# Patient Record
Sex: FEMALE | Race: WHITE | NOT HISPANIC OR LATINO | Employment: OTHER | URBAN - METROPOLITAN AREA
[De-identification: names, ages, dates, MRNs, and addresses within clinical notes are randomized per-mention and may not be internally consistent; named-entity substitution may affect disease eponyms.]

---

## 2023-09-10 ENCOUNTER — HOSPITAL ENCOUNTER (OUTPATIENT)
Facility: CLINIC | Age: 78
Setting detail: OBSERVATION
Discharge: HOME | End: 2023-09-11
Attending: EMERGENCY MEDICINE | Admitting: EMERGENCY MEDICINE
Payer: COMMERCIAL

## 2023-09-10 ENCOUNTER — APPOINTMENT (OUTPATIENT)
Dept: CT IMAGING | Facility: CLINIC | Age: 78
End: 2023-09-10
Attending: EMERGENCY MEDICINE
Payer: COMMERCIAL

## 2023-09-10 DIAGNOSIS — R42 DIZZINESS: ICD-10-CM

## 2023-09-10 DIAGNOSIS — I60.9 SUBARACHNOID HEMORRHAGE (H): ICD-10-CM

## 2023-09-10 DIAGNOSIS — Z23 NEED FOR DIPHTHERIA-TETANUS-PERTUSSIS (TDAP) VACCINE: ICD-10-CM

## 2023-09-10 DIAGNOSIS — I62.00 SUBDURAL HEMORRHAGE (H): ICD-10-CM

## 2023-09-10 DIAGNOSIS — S01.511A LIP LACERATION, INITIAL ENCOUNTER: ICD-10-CM

## 2023-09-10 DIAGNOSIS — S01.511A LIP LACERATION, INITIAL ENCOUNTER: Primary | ICD-10-CM

## 2023-09-10 LAB
ABO/RH(D): NORMAL
ALBUMIN SERPL BCG-MCNC: 4.3 G/DL (ref 3.5–5.2)
ALP SERPL-CCNC: 110 U/L (ref 35–104)
ALT SERPL W P-5'-P-CCNC: 27 U/L (ref 0–50)
ANION GAP SERPL CALCULATED.3IONS-SCNC: 12 MMOL/L (ref 7–15)
ANTIBODY SCREEN: NEGATIVE
AST SERPL W P-5'-P-CCNC: 28 U/L (ref 0–45)
ATRIAL RATE - MUSE: 80 BPM
BASOPHILS # BLD AUTO: 0.1 10E3/UL (ref 0–0.2)
BASOPHILS NFR BLD AUTO: 1 %
BILIRUB SERPL-MCNC: 0.3 MG/DL
BUN SERPL-MCNC: 11.7 MG/DL (ref 8–23)
CALCIUM SERPL-MCNC: 9.5 MG/DL (ref 8.8–10.2)
CHLORIDE SERPL-SCNC: 100 MMOL/L (ref 98–107)
CREAT SERPL-MCNC: 0.96 MG/DL (ref 0.51–0.95)
DEPRECATED HCO3 PLAS-SCNC: 25 MMOL/L (ref 22–29)
DIASTOLIC BLOOD PRESSURE - MUSE: NORMAL MMHG
EGFRCR SERPLBLD CKD-EPI 2021: 60 ML/MIN/1.73M2
EOSINOPHIL # BLD AUTO: 0.1 10E3/UL (ref 0–0.7)
EOSINOPHIL NFR BLD AUTO: 1 %
ERYTHROCYTE [DISTWIDTH] IN BLOOD BY AUTOMATED COUNT: 12.2 % (ref 10–15)
GLUCOSE SERPL-MCNC: 121 MG/DL (ref 70–99)
HCT VFR BLD AUTO: 40.7 % (ref 35–47)
HGB BLD-MCNC: 13.7 G/DL (ref 11.7–15.7)
IMM GRANULOCYTES # BLD: 0 10E3/UL
IMM GRANULOCYTES NFR BLD: 0 %
INR PPP: 0.95 (ref 0.85–1.15)
INTERPRETATION ECG - MUSE: NORMAL
LYMPHOCYTES # BLD AUTO: 1.5 10E3/UL (ref 0.8–5.3)
LYMPHOCYTES NFR BLD AUTO: 15 %
MCH RBC QN AUTO: 31.4 PG (ref 26.5–33)
MCHC RBC AUTO-ENTMCNC: 33.7 G/DL (ref 31.5–36.5)
MCV RBC AUTO: 93 FL (ref 78–100)
MONOCYTES # BLD AUTO: 0.6 10E3/UL (ref 0–1.3)
MONOCYTES NFR BLD AUTO: 6 %
NEUTROPHILS # BLD AUTO: 7.9 10E3/UL (ref 1.6–8.3)
NEUTROPHILS NFR BLD AUTO: 77 %
NRBC # BLD AUTO: 0 10E3/UL
NRBC BLD AUTO-RTO: 0 /100
P AXIS - MUSE: 61 DEGREES
PLATELET # BLD AUTO: 280 10E3/UL (ref 150–450)
POTASSIUM SERPL-SCNC: 4.2 MMOL/L (ref 3.4–5.3)
PR INTERVAL - MUSE: 176 MS
PROT SERPL-MCNC: 6.6 G/DL (ref 6.4–8.3)
QRS DURATION - MUSE: 156 MS
QT - MUSE: 450 MS
QTC - MUSE: 519 MS
R AXIS - MUSE: -64 DEGREES
RADIOLOGIST FLAGS: ABNORMAL
RBC # BLD AUTO: 4.36 10E6/UL (ref 3.8–5.2)
SODIUM SERPL-SCNC: 137 MMOL/L (ref 136–145)
SPECIMEN EXPIRATION DATE: NORMAL
SYSTOLIC BLOOD PRESSURE - MUSE: NORMAL MMHG
T AXIS - MUSE: 87 DEGREES
VENTRICULAR RATE- MUSE: 80 BPM
WBC # BLD AUTO: 10.1 10E3/UL (ref 4–11)

## 2023-09-10 PROCEDURE — 80053 COMPREHEN METABOLIC PANEL: CPT | Performed by: EMERGENCY MEDICINE

## 2023-09-10 PROCEDURE — 90715 TDAP VACCINE 7 YRS/> IM: CPT | Performed by: EMERGENCY MEDICINE

## 2023-09-10 PROCEDURE — 12013 RPR F/E/E/N/L/M 2.6-5.0 CM: CPT | Mod: 59

## 2023-09-10 PROCEDURE — G0378 HOSPITAL OBSERVATION PER HR: HCPCS

## 2023-09-10 PROCEDURE — 250N000013 HC RX MED GY IP 250 OP 250 PS 637: Performed by: EMERGENCY MEDICINE

## 2023-09-10 PROCEDURE — 90471 IMMUNIZATION ADMIN: CPT | Performed by: EMERGENCY MEDICINE

## 2023-09-10 PROCEDURE — 85610 PROTHROMBIN TIME: CPT | Performed by: EMERGENCY MEDICINE

## 2023-09-10 PROCEDURE — 86901 BLOOD TYPING SEROLOGIC RH(D): CPT | Performed by: EMERGENCY MEDICINE

## 2023-09-10 PROCEDURE — 36415 COLL VENOUS BLD VENIPUNCTURE: CPT | Performed by: EMERGENCY MEDICINE

## 2023-09-10 PROCEDURE — 93005 ELECTROCARDIOGRAM TRACING: CPT

## 2023-09-10 PROCEDURE — 85025 COMPLETE CBC W/AUTO DIFF WBC: CPT | Performed by: EMERGENCY MEDICINE

## 2023-09-10 PROCEDURE — 99285 EMERGENCY DEPT VISIT HI MDM: CPT | Mod: 25

## 2023-09-10 PROCEDURE — 250N000011 HC RX IP 250 OP 636: Performed by: EMERGENCY MEDICINE

## 2023-09-10 PROCEDURE — 86850 RBC ANTIBODY SCREEN: CPT | Performed by: EMERGENCY MEDICINE

## 2023-09-10 PROCEDURE — 70450 CT HEAD/BRAIN W/O DYE: CPT

## 2023-09-10 PROCEDURE — 250N000013 HC RX MED GY IP 250 OP 250 PS 637: Performed by: STUDENT IN AN ORGANIZED HEALTH CARE EDUCATION/TRAINING PROGRAM

## 2023-09-10 PROCEDURE — 99204 OFFICE O/P NEW MOD 45 MIN: CPT | Performed by: PHYSICIAN ASSISTANT

## 2023-09-10 PROCEDURE — 99223 1ST HOSP IP/OBS HIGH 75: CPT | Mod: AI | Performed by: STUDENT IN AN ORGANIZED HEALTH CARE EDUCATION/TRAINING PROGRAM

## 2023-09-10 RX ORDER — SIMVASTATIN 40 MG
40 TABLET ORAL AT BEDTIME
COMMUNITY

## 2023-09-10 RX ORDER — LORAZEPAM 0.5 MG/1
0.5 TABLET ORAL EVERY 6 HOURS PRN
COMMUNITY

## 2023-09-10 RX ORDER — CITALOPRAM HYDROBROMIDE 20 MG/1
20 TABLET ORAL DAILY
Status: DISCONTINUED | OUTPATIENT
Start: 2023-09-11 | End: 2023-09-11 | Stop reason: HOSPADM

## 2023-09-10 RX ORDER — ACETAMINOPHEN 650 MG/1
650 SUPPOSITORY RECTAL EVERY 6 HOURS PRN
Status: DISCONTINUED | OUTPATIENT
Start: 2023-09-10 | End: 2023-09-11 | Stop reason: HOSPADM

## 2023-09-10 RX ORDER — ACETAMINOPHEN 325 MG/1
650 TABLET ORAL ONCE
Status: COMPLETED | OUTPATIENT
Start: 2023-09-10 | End: 2023-09-10

## 2023-09-10 RX ORDER — TRAZODONE HYDROCHLORIDE 50 MG/1
50 TABLET, FILM COATED ORAL
Status: DISCONTINUED | OUTPATIENT
Start: 2023-09-10 | End: 2023-09-11 | Stop reason: HOSPADM

## 2023-09-10 RX ORDER — HYDRALAZINE HYDROCHLORIDE 20 MG/ML
5 INJECTION INTRAMUSCULAR; INTRAVENOUS EVERY 4 HOURS PRN
Status: DISCONTINUED | OUTPATIENT
Start: 2023-09-10 | End: 2023-09-11 | Stop reason: HOSPADM

## 2023-09-10 RX ORDER — GABAPENTIN 300 MG/1
300 CAPSULE ORAL EVERY MORNING
Status: DISCONTINUED | OUTPATIENT
Start: 2023-09-11 | End: 2023-09-11 | Stop reason: HOSPADM

## 2023-09-10 RX ORDER — ACETAMINOPHEN 325 MG/1
650 TABLET ORAL EVERY 6 HOURS PRN
Status: DISCONTINUED | OUTPATIENT
Start: 2023-09-10 | End: 2023-09-11 | Stop reason: HOSPADM

## 2023-09-10 RX ORDER — SIMVASTATIN 40 MG
40 TABLET ORAL AT BEDTIME
Status: DISCONTINUED | OUTPATIENT
Start: 2023-09-10 | End: 2023-09-11 | Stop reason: HOSPADM

## 2023-09-10 RX ORDER — CITALOPRAM HYDROBROMIDE 20 MG/1
20 TABLET ORAL DAILY
COMMUNITY

## 2023-09-10 RX ORDER — GABAPENTIN 300 MG/1
300 CAPSULE ORAL EVERY MORNING
COMMUNITY

## 2023-09-10 RX ORDER — LAMOTRIGINE 200 MG/1
200 TABLET ORAL 2 TIMES DAILY
COMMUNITY

## 2023-09-10 RX ORDER — TRAZODONE HYDROCHLORIDE 50 MG/1
50 TABLET, FILM COATED ORAL
COMMUNITY

## 2023-09-10 RX ORDER — GABAPENTIN 300 MG/1
600 CAPSULE ORAL AT BEDTIME
Status: DISCONTINUED | OUTPATIENT
Start: 2023-09-10 | End: 2023-09-11 | Stop reason: HOSPADM

## 2023-09-10 RX ORDER — ONDANSETRON 2 MG/ML
4 INJECTION INTRAMUSCULAR; INTRAVENOUS EVERY 6 HOURS PRN
Status: DISCONTINUED | OUTPATIENT
Start: 2023-09-10 | End: 2023-09-11 | Stop reason: HOSPADM

## 2023-09-10 RX ORDER — ONDANSETRON 4 MG/1
4 TABLET, ORALLY DISINTEGRATING ORAL EVERY 6 HOURS PRN
Status: DISCONTINUED | OUTPATIENT
Start: 2023-09-10 | End: 2023-09-11 | Stop reason: HOSPADM

## 2023-09-10 RX ORDER — LAMOTRIGINE 100 MG/1
200 TABLET ORAL 2 TIMES DAILY
Status: DISCONTINUED | OUTPATIENT
Start: 2023-09-10 | End: 2023-09-11 | Stop reason: HOSPADM

## 2023-09-10 RX ORDER — GABAPENTIN 300 MG/1
600 CAPSULE ORAL AT BEDTIME
COMMUNITY

## 2023-09-10 RX ADMIN — GABAPENTIN 600 MG: 300 CAPSULE ORAL at 21:09

## 2023-09-10 RX ADMIN — SIMVASTATIN 40 MG: 40 TABLET, FILM COATED ORAL at 21:09

## 2023-09-10 RX ADMIN — ACETAMINOPHEN 650 MG: 325 TABLET, FILM COATED ORAL at 16:06

## 2023-09-10 RX ADMIN — ACETAMINOPHEN 650 MG: 325 TABLET, FILM COATED ORAL at 22:20

## 2023-09-10 RX ADMIN — LAMOTRIGINE 200 MG: 100 TABLET ORAL at 21:08

## 2023-09-10 RX ADMIN — CLOSTRIDIUM TETANI TOXOID ANTIGEN (FORMALDEHYDE INACTIVATED), CORYNEBACTERIUM DIPHTHERIAE TOXOID ANTIGEN (FORMALDEHYDE INACTIVATED), BORDETELLA PERTUSSIS TOXOID ANTIGEN (GLUTARALDEHYDE INACTIVATED), BORDETELLA PERTUSSIS FILAMENTOUS HEMAGGLUTININ ANTIGEN (FORMALDEHYDE INACTIVATED), BORDETELLA PERTUSSIS PERTACTIN ANTIGEN, AND BORDETELLA PERTUSSIS FIMBRIAE 2/3 ANTIGEN 0.5 ML: 5; 2; 2.5; 5; 3; 5 INJECTION, SUSPENSION INTRAMUSCULAR at 13:55

## 2023-09-10 ASSESSMENT — ACTIVITIES OF DAILY LIVING (ADL)
ADLS_ACUITY_SCORE: 33
ADLS_ACUITY_SCORE: 33
ADLS_ACUITY_SCORE: 37
ADLS_ACUITY_SCORE: 37
ADLS_ACUITY_SCORE: 33

## 2023-09-10 NOTE — PHARMACY-ADMISSION MEDICATION HISTORY
Pharmacy Intern Admission Medication History    Admission medication history is complete. The information provided in this note is only as accurate as the sources available at the time of the update.    Medication reconciliation/reorder completed by provider prior to medication history? No    Information Source(s): Patient and CareEverywhere/SureScripts via in-person    Pertinent Information: Patient takes trazodone as needed but has not needed much in the last month due to taking gabapentin which helps her sleep.     Changes made to PTA medication list:  Added: citalopram, gabapentin, simvastatin, Lamictal, trazodone, Ativan  Deleted: None  Changed: None    Medication Affordability: Not including over the counter (OTC) medications, was there a time in the past 3 months when you did not take your medications as prescribed because of cost? No     Allergies reviewed with patient and updates made in EHR: yes- no updates made     Medication History Completed By: Jazz Bernstein 9/10/2023 2:24 PM    Prior to Admission medications    Medication Sig Last Dose Taking? Auth Provider Long Term End Date   citalopram (CELEXA) 20 MG tablet Take 20 mg by mouth daily 9/10/2023 at AM Yes Unknown, Entered By History Yes    gabapentin (NEURONTIN) 300 MG capsule Take 300 mg by mouth every morning 9/10/2023 at AM Yes Unknown, Entered By History Yes    gabapentin (NEURONTIN) 300 MG capsule Take 600 mg by mouth At Bedtime 9/9/2023 at HS Yes Unknown, Entered By History Yes    lamoTRIgine (LAMICTAL) 200 MG tablet Take 200 mg by mouth 2 times daily 9/10/2023 at AM Yes Unknown, Entered By History Yes    LORazepam (ATIVAN) 0.5 MG tablet Take 0.5 mg by mouth every 6 hours as needed for anxiety  at PRN Yes Unknown, Entered By History     simvastatin (ZOCOR) 40 MG tablet Take 40 mg by mouth At Bedtime 9/9/2023 at PM Yes Unknown, Entered By History Yes    traZODone (DESYREL) 50 MG tablet Take 50 mg by mouth nightly as needed for sleep Past Month  at PRN Yes Unknown, Entered By History Yes

## 2023-09-10 NOTE — H&P
[facility]    History and Physical - Hospitalist Service       Date of Admission:  9/10/2023    Assessment & Plan      Acacia Pinedo is a 77 year old female who presented to the ED for a fall. She was found to have ICH and will be admitted for further management.    Fall  Subdural hematoma  Subarachnoid hemorrhage  Scalp hematoma  Lip laceration  Patient has history of falls over the last 6 months. She denies dizziness most of the time and feels just unsteady on her feet resulting in falling forward onto her face. Last fall was over a month ago while on a trip, she fell on her face while stepping off the bus. She also reports tripping on her steps often at home. Today while walking up her steps she doesn't know what happened but likely tripped on the top step resulting in her falling forward. She was carrying a hotdish and the dish was found broken. She doesn't completely remember how she fell but did pull herself up and drive [facility] her with several facial injuries and took her to ED. Within the ED, CT imaging of the head revealed multifocal acute ICH. Neurosurgery was contacted and recommended admission for observation. Tele noted appearance of LBBB but regular.  *Although not stated on med list patient does report taking daily 81mg aspirin    - admit to observation with repeat head CT ordered for tomorrow AM. Keep HOB >30 degrees  - start small dose of PRN hydralazine for BP >150  - initiate syncope work up with cardiac echo, tele, orthostatic BP and PT evaluation. Obtain initial EKG as one was not done in the ER.  - lip stiches will need to be removed in 5-7 days  - patient has outpatient MRI planned next week for her frequent falls. She asked whether this could be done here however I am uncertain how her new hemorrhage will affect the results. CT head reassuring without infarcts.    EKG returned with new since LBBB since 2012. Last EKG I  could see was in [facility] system. On admission patient denied chest pain or palpitations prior to her fall. No hypoxia. Recommend to monitor on tele overnight and follow results of echo.        Chronic stable medical problems  Anxiety  Depression  - resume home medications as indicated           Diet:  FULL  DVT Prophylaxis: Pneumatic Compression Devices  Cruz Catheter: Not present  Lines: None     Cardiac Monitoring: None  Code Status:  DNR    Clinically Significant Risk Factors Present on Admission                                  Disposition Plan      Expected Discharge Date: 09/11/2023                  Michaela Dan DO  Hospitalist Service  [facility]  Securely message with [employer] (more info)  Text page via [facility] Paging/Directory     ______________________________________________________________________    Chief Complaint   fall    History is obtained from the patient    History of Present Illness   Patient has history of falls over the last 6 months. She denies dizziness most of the time and feels just unsteady on her feet resulting in falling forward onto her face. Last fall was over a month ago while on a trip, she fell on her face while stepping off the bus. She also reports tripping on her steps often at home. Today while walking up her steps she doesn't know what happened but likely tripped on the top step resulting in her falling forward. She was carrying a hotdish and the dish was found broken. She doesn't completely remember how she fell but did pull herself up and drive to her [facility] where her friends found her with several facial injuries and took her to ED. Patient does follow with the dizzy clinic but doesn't think it has really helped much. She denies palpitation ever and denies dizziness most of the time she falls however a few times she has felt dizzy. She does report taking a while to stand up and sometimes her knees buckling due to weakness. She lives alone.  She did drive to her chruch after her fall. She denies fevers or chills. No dysuria.       Past Medical History    No past medical history on file.    Past Surgical History   No past surgical history on file.    Prior to Admission Medications   Prior to Admission Medications   Prescriptions Last Dose Informant Patient Reported? Taking?   LORazepam (ATIVAN) 0.5 MG tablet  at PRN Self Yes Yes   Sig: Take 0.5 mg by mouth every 6 hours as needed for anxiety   citalopram (CELEXA) 20 MG tablet 9/10/2023 at AM Self Yes Yes   Sig: Take 20 mg by mouth daily   gabapentin (NEURONTIN) 300 MG capsule 9/10/2023 at AM Self Yes Yes   Sig: Take 300 mg by mouth every morning   gabapentin (NEURONTIN) 300 MG capsule 9/9/2023 at HS Self Yes Yes   Sig: Take 600 mg by mouth At Bedtime   lamoTRIgine (LAMICTAL) 200 MG tablet 9/10/2023 at AM Self Yes Yes   Sig: Take 200 mg by mouth 2 times daily   simvastatin (ZOCOR) 40 MG tablet 9/9/2023 at PM Self Yes Yes   Sig: Take 40 mg by mouth At Bedtime   traZODone (DESYREL) 50 MG tablet Past Month at PRN Self Yes Yes   Sig: Take 50 mg by mouth nightly as needed for sleep      Facility-Administered Medications: None        Review of Systems    The 10 point Review of Systems is negative other than noted in the HPI or here.     Physical Exam   Vital Signs: Temp: 98.4  F (36.9  C) Temp src: Temporal BP: (!) 148/75 Pulse: 82   Resp: 26 SpO2: 97 % O2 Device: None (Room air)    Weight: 150 lbs 0 oz    Constitutional: Awake, alert, cooperative, no apparent distress.  HEENT: repaired lip laceration to R upper lip with associated  bleeding, bruising around R eye  Respiratory: Clear to auscultation bilaterally, no crackles or wheezing.  Cardiovascular: Regular rate and rhythm, normal S1 and S2, and no murmur noted.  GI: Soft, non-distended, non-tender, normal bowel sounds.  Skin: No rashes, no cyanosis, no edema.  Musculoskeletal: No joint swelling, erythema or tenderness.  Neurologic  normal strength and  sensation. Did not assess gait  Psychiatric: Alert, oriented to person, place and time, no obvious anxiety or depression.     Medical Decision Making       85 MINUTES SPENT BY ME on the date of service doing chart review, history, exam, documentation & further activities per the note.      Data     I have personally reviewed the following data over the past 24 hrs:    10.1  \   13.7   / 280     137 100 11.7 /  121 (H)   4.2 25 0.96 (H) \     ALT: 27 AST: 28 AP: 110 (H) TBILI: 0.3   ALB: 4.3 TOT PROTEIN: 6.6 LIPASE: N/A     INR:  0.95 PTT:  N/A   D-dimer:  N/A Fibrinogen:  N/A       Imaging results reviewed over the past 24 hrs:   Recent Results (from the past 24 hour(s))   Head CT w/o contrast   Result Value    Radiologist flags Acute intracranial hemorrhage (AA)    Narrative    EXAM: CT HEAD W/O CONTRAST  LOCATION: [facility]  DATE: 9/10/2023    INDICATION: fall, no blood thinners  COMPARISON: 07/30/2009  TECHNIQUE: Routine CT Head without IV contrast. Multiplanar reformats. Dose reduction techniques were used.    FINDINGS:  INTRACRANIAL CONTENTS: There is acute right parafalcine subdural hematoma measuring up to 6 mm in thickness. The subdural hematoma extends along the right aspect of the tentorium measuring up to 4 mm in thickness. There is right frontotemporal convexity   acute subdural hematoma measuring 5 mm in thickness. Trace adjacent right frontal subarachnoid hemorrhage. No significant mass effect or midline shift. No CT evidence of acute infarct. Mild presumed chronic small vessel ischemic changes. Mild to moderate   generalized volume loss. No hydrocephalus. Intracranial atherosclerosis is present.     VISUALIZED ORBITS/SINUSES/MASTOIDS: No intraorbital abnormality. No paranasal sinus mucosal disease. No middle ear or mastoid effusion.    BONES/SOFT TISSUES: There is scalp hematoma near the left parietal vertex. Negative for calvarial fracture.      Impression     IMPRESSION:  1.  Multifocal, multicompartment acute intracranial hemorrhage as described above.  2.  No significant mass effect or midline shift.  3.  Scalp hematoma near the left parietal vertex.      [Critical Result: Acute intracranial hemorrhage    Finding was identified on 9/10/2023 1:23 PM CDT.     1.  Dr. Peck was contacted by me on 9/10/2023 1:33 PM CDT and verbalized understanding of the critical result.

## 2023-09-10 NOTE — PROGRESS NOTES
RECEIVING UNIT ED HANDOFF REVIEW    ED Nurse Handoff Report was reviewed by: Eleanor Holland RN on September 10, 2023 at 5:04 PM

## 2023-09-10 NOTE — ED TRIAGE NOTES
Walking up steps and fell on landing, pt goes to [facility], lac to rt upper lip, bruise to rt temporal, bruises to bilat hands, no blood thinners, no loc     Triage Assessment       Row Name 09/10/23 1224       Triage Assessment (Adult)    Airway WDL WDL       Respiratory WDL    Respiratory WDL WDL       Cardiac WDL    Cardiac WDL WDL       Cognitive/Neuro/Behavioral WDL    Cognitive/Neuro/Behavioral WDL WDL

## 2023-09-10 NOTE — PLAN OF CARE
Reason for Admission: ICH & subdural hematoma    Cognitive/Mentation: A/Ox 4  Neuros/CMS: Intact   VS: stable.   Tele: NSR.  GI: Continent.  : Continent.  Pulmonary: LS clear, diminished in bases.  Pain: 5/10 headache, Tylenol given at 1600.     Drains/Lines: L PIV SL  Skin: intact ex bruising on L eye, L lip & stitches.  Activity: Assist x 1 with gait belt.  Diet: Regular with thin liquids. Takes pills whole.     Therapies recs: tbd  Discharge: pending    Aggression Stoplight Tool: green    End of shift summary: Pt arrived on unit at 1745. Admission complete.

## 2023-09-10 NOTE — ED PROVIDER NOTES
History     Chief Complaint:  Fall, Head Injury, and Laceration       The history is provided by the patient.      Acacia Pinedo is a 77 year old female with a history of hyperlipidemia who presents via private vehicle with a laceration to upper lip, bruise on right temporal region, and bruises to both hands after missing a step and falling on her landing. Patient denies loss of consciousness, headache, neck pain, nausea, vomiting, or other additional injuries. Scheduled for MRI of head on 9/12 for ongoing balance issues, as she is already seen in the [facility] clinic.  She is not anticoagulated.  She is unsure of her last tetanus shot.     Independent Historian:    Patient only    Review of External Notes:  I reviewed the miic as well as an internal mecidine notes from 6/2023.        Medications:    Celexa  Neurontin  Lamictal  Ativan  Zocor  Desyrel  Vraylar  Naproxen  Zocor    Past Medical History:    Osteopenia  Bipolar 1 disorder  Hyperlipidemia   Arthritis  PAD  Depressive disorder    Past Surgical History:    CHIDI and BSO  Cholecystectomy  Tonsillectomy  Colonoscopy    Physical Exam   Patient Vitals for the past 24 hrs:   BP Temp Temp src Pulse Resp SpO2 Weight   09/10/23 1430 (!) 148/75 -- -- 82 26 97 % --   09/10/23 1402 (!) 156/75 -- -- -- -- -- --   09/10/23 1401 -- -- -- 84 18 96 % --   09/10/23 1223 (!) 152/77 98.4  F (36.9  C) Temporal 86 20 94 % 68 kg (150 lb)      Physical Exam  Nursing note and vitals reviewed.  Constitutional:  Oriented to person, place, and time. Cooperative.   HENT:   Head:   Confusion and tenderness to palpation to the right temporal region.  Nose:    Nose normal.   Mouth/Throat:   3.5 cm laceration to the right upper lip region.  Teeth are stable.  Mucous membranes are normal.   Eyes:    Conjunctivae normal and EOM are normal.      Pupils are equal, round, and reactive to light.   Neck:    Trachea normal.   Cardiovascular:  Normal rate, regular rhythm, normal heart  sounds and normal pulses. No murmur heard.  Pulmonary/Chest:  Effort normal and breath sounds normal.   Abdominal:   Soft. Normal appearance and bowel sounds are normal.      There is no tenderness.      There is no rebound and no CVA tenderness.   Musculoskeletal:  Small contusions to both hands which are minimally tender to palpation.  No cervical spine tenderness to palpation.  Extremities atraumatic x 4.   Lymphadenopathy:  No cervical adenopathy.   Neurological:   Alert and oriented to person, place, and time. Normal strength.      No cranial nerve deficit or sensory deficit. GCS eye subscore is 4. GCS verbal subscore is 5. GCS motor subscore is 6.   Skin:    Skin is intact. No rash noted.   Psychiatric:   Normal mood and affect.      Emergency Department Course     Imaging:  Head CT w/o contrast   Final Result   Abnormal   IMPRESSION:   1.  Multifocal, multicompartment acute intracranial hemorrhage as described above.   2.  No significant mass effect or midline shift.   3.  Scalp hematoma near the left parietal vertex.         [Critical Result: Acute intracranial hemorrhage      Finding was identified on 9/10/2023 1:23 PM CDT.       1.  Dr. Peck was contacted by me on 9/10/2023 1:33 PM CDT and verbalized understanding of the critical result.         Report per radiology    Laboratory:  Labs Ordered and Resulted from Time of ED Arrival to Time of ED Departure   COMPREHENSIVE METABOLIC PANEL - Abnormal       Result Value    Sodium 137      Potassium 4.2      Chloride 100      Carbon Dioxide (CO2) 25      Anion Gap 12      Urea Nitrogen 11.7      Creatinine 0.96 (*)     Calcium 9.5      Glucose 121 (*)     Alkaline Phosphatase 110 (*)     AST 28      ALT 27      Protein Total 6.6      Albumin 4.3      Bilirubin Total 0.3      GFR Estimate 60 (*)    INR - Normal    INR 0.95     CBC WITH PLATELETS AND DIFFERENTIAL    WBC Count 10.1      RBC Count 4.36      Hemoglobin 13.7      Hematocrit 40.7      MCV 93       MCH 31.4      MCHC 33.7      RDW 12.2      Platelet Count 280      % Neutrophils 77      % Lymphocytes 15      % Monocytes 6      % Eosinophils 1      % Basophils 1      % Immature Granulocytes 0      NRBCs per 100 WBC 0      Absolute Neutrophils 7.9      Absolute Lymphocytes 1.5      Absolute Monocytes 0.6      Absolute Eosinophils 0.1      Absolute Basophils 0.1      Absolute Immature Granulocytes 0.0      Absolute NRBCs 0.0     TYPE AND SCREEN, ADULT    ABO/RH(D) A POS      SPECIMEN EXPIRATION DATE [id number]     ABO/RH TYPE AND SCREEN        [facility]    -Laceration Repair    Date/Time: 9/10/2023 3:14 PM    Performed by: Brien Peck MD  Authorized by: Brien Peck MD    Risks, benefits and alternatives discussed.      ANESTHESIA (see MAR for exact dosages):     Anesthesia method:  Local infiltration    Local anesthetic:  Bupivacaine 0.5% w/o epi  LACERATION DETAILS     Location:  Lip    Lip location:  Upper exterior lip    Length (cm):  3.5    REPAIR TYPE:     Repair type:  Simple    EXPLORATION:     Wound exploration: entire depth of wound probed and visualized      Contaminated: no      TREATMENT:     Amount of cleaning:  Standard    Irrigation solution:  Sterile water    Irrigation method:  Syringe    Visualized foreign bodies/material removed: no      SKIN REPAIR     Repair method:  Sutures    Suture size:  6-0    Suture material:  Nylon    Suture technique:  Simple interrupted    Number of sutures:  6    APPROXIMATION     Approximation:  Close    Vermilion border well-aligned: yes      POST-PROCEDURE DETAILS     Dressing:  Open (no dressing)      PROCEDURE    Patient Tolerance:  Patient tolerated the procedure well with no immediate complications         Emergency Department Course & Assessments:         Interventions:  Medications   Tdap (tetanus-diphtheria-acell pertussis) (ADACEL) injection 0.5 mL (0.5 mLs Intramuscular $Given 9/10/23 3321)       Assessments:  1348 I obtained history and examined the patient as noted above.     Independent Interpretation (X-rays, CTs, rhythm strip):  None    Consultations/Discussion of Management or Tests:  1524 I spoke with Dr. Dan, hospitalist, regarding the patient's presentation and plan of care.        Social Determinants of Health affecting care:  None     Disposition:  The patient was admitted to the hospital under the care of Dr. Dan.     Impression & Plan    CMS Diagnoses: None    Medical Decision Making:  This is a 77-year-old female who came in by private vehicle after she had a mechanical fall.  She had a CT scan of her head obtained from triage, and I was called by the radiologist regarding the above findings.  I subsequently spoke with the neurosurgery PA, Efe Cates, who reviewed the patient's scan and saw the patient.  She does not require any type of intervention, but we will bring her into the hospital and she will have a repeat CT scan tomorrow morning.  I subsequently repaired the laceration to her lip per the above procedure note.  She was provided an update of her tetanus and pertussis status as well.  She does not appear to have any other acute injuries.  I subsequently spoke with Dr. Dan, who will be taking care of her.    Diagnosis:    ICD-10-CM    1. Subdural hemorrhage (H)  I62.00       2. Subarachnoid hemorrhage (H)  I60.9       3. Lip laceration, initial encounter  S01.511A       4. Need for diphtheria-tetanus-pertussis (Tdap) vaccine  Z23       5. Dizziness  R42          Scribe Disclosure:  I, Azam Thomas, am serving as a scribe at 3:17 PM on 9/10/2023 to document services personally performed by Brien Peck MD, based on my observations and the provider's statements to me.  9/10/2023   Brien Peck MD Lashkowitz, Seth H, MD  09/10/23 2548

## 2023-09-10 NOTE — CONSULTS
Neurosurgery Consult    HPI    Ms. Pinedo is a 77-year-old female who presents emergency department after a fall, where she hit the side of her head and her face.  She denies loss of consciousness headache.  She underwent a head CT scan in the ER today which demonstrated subdural hematoma.  Patient is not on any anticoagulation.  She has a laceration of her lip.  He is being admitted by the hospitalist for observation.    Medical history  Osteopenia  Bipolar 1 disorder  Hyperlipidemia   Arthritis  PAD  Depressive disorder        B/P: 148/75, T: 98.4, P: 82, R: 26       Exam    Alert and oriented no acute distress  Moving all extremities  Finger-nose slow and accurate  Negative pronator drift  Extraocular movements intact  Pupils equal and reactive        Imaging    Head CT scan demonstrated    FINDINGS:  INTRACRANIAL CONTENTS: There is acute right parafalcine subdural hematoma measuring up to 6 mm in thickness. The subdural hematoma extends along the right aspect of the tentorium measuring up to 4 mm in thickness. There is right frontotemporal convexity   acute subdural hematoma measuring 5 mm in thickness. Trace adjacent right frontal subarachnoid hemorrhage. No significant mass effect or midline shift. No CT evidence of acute infarct. Mild presumed chronic small vessel ischemic changes. Mild to moderate   generalized volume loss. No hydrocephalus. Intracranial atherosclerosis is present.      VISUALIZED ORBITS/SINUSES/MASTOIDS: No intraorbital abnormality. No paranasal sinus mucosal disease. No middle ear or mastoid effusion.     BONES/SOFT TISSUES: There is scalp hematoma near the left parietal vertex. Negative for calvarial fracture.                                                                      IMPRESSION:  1.  Multifocal, multicompartment acute intracranial hemorrhage as described above.  2.  No significant mass effect or midline shift.  3.  Scalp hematoma near the left parietal  vertex.    Assessment    Traumatic Subdural hematoma, subarachnoid hemorrhage, following fall, no midline shift neurologically intact, not anticoagulated    Plan:      Recommend admission by the hospitalist, blood pressure less than 150, head of bed 30 degrees, repeat CT scan tomorrow morning.  Avoid any antiplatelet medication or anticoagulation.

## 2023-09-10 NOTE — LETTER
Transition Communication Hand-off for Care Transitions to Next Level of Care Provider    Name: Acacia Pinedo  : 1945  MRN #: [id number]  Primary Care Provider: [facility] Dr. Kristi Salinas     Primary Clinic: [address] [facility] [address]     Reason for Hospitalization:  Subarachnoid hemorrhage (H) [I60.9]  Subdural hemorrhage (H) [I62.00]  Dizziness [R42]  Need for diphtheria-tetanus-pertussis (Tdap) vaccine [Z23]  Lip laceration, initial encounter [S01.511A]  Admit Date/Time: 9/10/2023  1:34 PM  Discharge Date: 23  Payor Source: Payor: [employer] / Plan: HU[employer] MEDICARE ADVANTAGE / Product Type: Medicare /          Reason for Communication Hand-off Referral: Multiple providers/specialties  Other [employer] insurance out of network at [facility]    Discharge Plan:  discharge to home, will continue OP PT/OT at [facility] in [place]       Concern for non-adherence with plan of care:  no    Follow-up specialty is recommended: Yes - Cardiology and Neurosurgery      Any outstanding tests or procedures:        Radiology & Cardiology Orders       Future Labs/Procedures Complete By Expires    Leadless EKG Monitor 3 to 14 Days  2023 (Approximate) 2024    CT Head w/o contrast*  10/11/2023 (Approximate) 12/10/2023    Process Instructions:    Administration of IV contrast (contrast agent, dose, and amount) will be tailored to this examination per the appropriate written protocol listed in the Protocol E-Book, or by the interpreting provider. If you do not want your order altered by the radiologist, please state that in the order comments.          Referrals       Future Labs/Procedures    Physical Therapy Referral     Process Instructions:    Work Related Injury: Functional Capacity and Work Conditioning are only offered at [facility] and [facility] (service can be provided by PT or OT).    *This therapy referral will be filtered to a  centralized scheduling office at [facility] and the patient will receive a call to schedule an appointment at a [place] location most convenient for them. *    Comments:    Please be aware that coverage of these services is subject to the terms and limitations of your health insurance plan.  Call member services at your health plan with any benefit or coverage questions.  If you have not heard from the scheduling office within 2 business days, please call [phone] for [facility], [phone] for [place] and [phone] for [facility].              Key Recommendations:  Patient needs follow up with Neurosurgery and Cardiology at [facility].  Cardiology appointment has been scheduled.  Will need referral from PCP for Neurosurgery.  Thank you.    Kimberly Schumacher RN    AVS/Discharge Summary is the source of truth; this is a helpful guide for improved communication of patient story

## 2023-09-11 ENCOUNTER — APPOINTMENT (OUTPATIENT)
Dept: CARDIOLOGY | Facility: CLINIC | Age: 78
End: 2023-09-11
Attending: STUDENT IN AN ORGANIZED HEALTH CARE EDUCATION/TRAINING PROGRAM
Payer: COMMERCIAL

## 2023-09-11 ENCOUNTER — APPOINTMENT (OUTPATIENT)
Dept: PHYSICAL THERAPY | Facility: CLINIC | Age: 78
End: 2023-09-11
Attending: STUDENT IN AN ORGANIZED HEALTH CARE EDUCATION/TRAINING PROGRAM
Payer: COMMERCIAL

## 2023-09-11 ENCOUNTER — APPOINTMENT (OUTPATIENT)
Dept: CT IMAGING | Facility: CLINIC | Age: 78
End: 2023-09-11
Attending: STUDENT IN AN ORGANIZED HEALTH CARE EDUCATION/TRAINING PROGRAM
Payer: COMMERCIAL

## 2023-09-11 ENCOUNTER — APPOINTMENT (OUTPATIENT)
Dept: CARDIOLOGY | Facility: CLINIC | Age: 78
End: 2023-09-11
Attending: INTERNAL MEDICINE
Payer: COMMERCIAL

## 2023-09-11 VITALS
OXYGEN SATURATION: 94 % | DIASTOLIC BLOOD PRESSURE: 78 MMHG | SYSTOLIC BLOOD PRESSURE: 154 MMHG | RESPIRATION RATE: 16 BRPM | WEIGHT: 150 LBS | HEART RATE: 67 BPM | TEMPERATURE: 98.2 F

## 2023-09-11 LAB — LVEF ECHO: NORMAL

## 2023-09-11 PROCEDURE — 99204 OFFICE O/P NEW MOD 45 MIN: CPT | Mod: 25 | Performed by: INTERNAL MEDICINE

## 2023-09-11 PROCEDURE — 93248 EXT ECG>7D<15D REV&INTERPJ: CPT | Performed by: INTERNAL MEDICINE

## 2023-09-11 PROCEDURE — 255N000002 HC RX 255 OP 636: Performed by: INTERNAL MEDICINE

## 2023-09-11 PROCEDURE — 93246 EXT ECG>7D<15D RECORDING: CPT

## 2023-09-11 PROCEDURE — 97161 PT EVAL LOW COMPLEX 20 MIN: CPT | Mod: GP

## 2023-09-11 PROCEDURE — G0378 HOSPITAL OBSERVATION PER HR: HCPCS

## 2023-09-11 PROCEDURE — 250N000013 HC RX MED GY IP 250 OP 250 PS 637: Performed by: STUDENT IN AN ORGANIZED HEALTH CARE EDUCATION/TRAINING PROGRAM

## 2023-09-11 PROCEDURE — 93306 TTE W/DOPPLER COMPLETE: CPT | Mod: 26 | Performed by: INTERNAL MEDICINE

## 2023-09-11 PROCEDURE — 97530 THERAPEUTIC ACTIVITIES: CPT | Mod: GP

## 2023-09-11 PROCEDURE — 70450 CT HEAD/BRAIN W/O DYE: CPT

## 2023-09-11 PROCEDURE — 97116 GAIT TRAINING THERAPY: CPT | Mod: GP

## 2023-09-11 PROCEDURE — 999N000208 ECHOCARDIOGRAM COMPLETE

## 2023-09-11 PROCEDURE — 99239 HOSP IP/OBS DSCHRG MGMT >30: CPT | Performed by: INTERNAL MEDICINE

## 2023-09-11 RX ORDER — ACETAMINOPHEN 325 MG/1
650 TABLET ORAL EVERY 6 HOURS PRN
COMMUNITY
Start: 2023-09-11

## 2023-09-11 RX ADMIN — CITALOPRAM HYDROBROMIDE 20 MG: 20 TABLET ORAL at 09:06

## 2023-09-11 RX ADMIN — HUMAN ALBUMIN MICROSPHERES AND PERFLUTREN 9 ML: 10; .22 INJECTION, SOLUTION INTRAVENOUS at 10:00

## 2023-09-11 RX ADMIN — ACETAMINOPHEN 650 MG: 325 TABLET, FILM COATED ORAL at 10:49

## 2023-09-11 RX ADMIN — ACETAMINOPHEN 650 MG: 325 TABLET, FILM COATED ORAL at 04:05

## 2023-09-11 RX ADMIN — GABAPENTIN 300 MG: 300 CAPSULE ORAL at 09:06

## 2023-09-11 RX ADMIN — ACETAMINOPHEN 650 MG: 325 TABLET, FILM COATED ORAL at 17:43

## 2023-09-11 RX ADMIN — LAMOTRIGINE 200 MG: 100 TABLET ORAL at 09:06

## 2023-09-11 ASSESSMENT — ACTIVITIES OF DAILY LIVING (ADL)
ADLS_ACUITY_SCORE: 33

## 2023-09-11 NOTE — DISCHARGE SUMMARY
[facility]  Hospitalist Discharge Summary      Date of Admission:  9/10/2023  Date of Discharge:  9/11/2023  Discharging Provider: Reva Sanchez MD, FACP  Discharge Service: Hospitalist Service    Discharge Diagnoses   S/p fall.  Subdural hematoma.  Subarachnoid hemorrhage.  Scalp hematoma.  Lip laceration.  Newly diagnosed left bundle branch block, age indeterminant.  Anxiety and depression, stable.    Clinically Significant Risk Factors        Follow-ups Needed After Discharge   Follow-up Appointments     Follow-up and recommended labs and tests       Please follow up at [facility] Neurosurgery in 1 month with head   CT prior.  Please call the clinic at [phone] to schedule your   appointment.    Speak to Dr. Salinas about needing a follow up with Neurosurgery and a CT   scan in 4 weeks at [facility] ([employer] insurance out of network with   [place])        Follow-up and recommended labs and tests       Follow up with Cardiology in 4-6 weeks    An appointment is scheduled with Dr. Campo, [facility] on   October 10 at 12:45 [place] [address]  Phone:  [phone]  [place]        Follow-up and recommended labs and tests       Follow up with primary care provider, [facility],   within 7 days for hospital follow- up.  The following labs/tests are   recommended: BMP and CBC.  Patient has stitches will need to be removed in   7 days.  Follow-up with neurosurgery and cardiology as recommended            Unresulted Labs Ordered in the Past 30 Days of this Admission       No orders found for last 31 day(s).            Discharge Disposition   Discharged to home  Condition at discharge: Stable    Hospital Course     Acacia Pinedo is a 77 year old female who presented to the ED for a fall. She was found to have ICH and will be admitted for further management.  Here are further details regarding  her current hospitalization.     Fall  Subdural hematoma  Subarachnoid hemorrhage  Scalp hematoma  Lip laceration  Patient has history of falls over the last 6 months. She denies dizziness most of the time and feels just unsteady on her feet resulting in falling forward onto her face. Last fall was over a month ago while on a trip, she fell on her face while stepping off the bus. She also reports tripping on her steps often at home. Today while walking up her steps she doesn't know what happened but likely tripped on the top step resulting in her falling forward. She was carrying a hotdish and the dish was found broken. She doesn't completely remember how she fell but did pull herself up and drive to her [employer] where her friends found her with several facial injuries and took her to ED. Within the ED, CT imaging of the head revealed multifocal acute ICH. Neurosurgery was contacted and recommended admission for observation. Tele noted appearance of LBBB but regular.  *Although not stated on med list patient does report taking daily 81mg aspirin    -- Patient was admitted for observation with repeat head CT ordered for this morning.  --As needed hydralazine was available for systolic blood pressure more than 150 but patient did not needed.  --Syncope work-up was done with telemetry, cardiac echocardiogram and EKG.  --Patient EKG did show did show a new left bundle branch block, age indeterminant, see below.  --Patient was not found to be orthostatic  --Patient was evaluated by neurosurgery, at this time is recommended to have conservative management.  --CT scan of the head was repeated this morning which showed unchanged subdural hematoma but worsening mass effect but no interval rebleeding.  --Neurosurgery is recommending repeat head CT in 4 weeks with follow-up with neurosurgery.  --Discussed with patient that she should not not take any aspirin or NSAIDs till she has been evaluated by neurosurgery and has  been cleared to take those medications.  --Lip stitches will need to be removed in 5 to 7 days    New left bundle branch block: EKG returned with new since LBBB since 2012. Last EKG was in [facility] system. On admission patient denied chest pain or palpitations prior to her fall. No hypoxia.  No known history of CAD     -- Patient has been monitored on telemetry   -- ECHO is showing normal EF of 55-60% no pericardial effusion, grade 1 or early diastolic dysfunction, no wall motion abnormalities.  -- Due to the presence of new LBBB and concern for presyncope , would prefer patient to be evaluated by Cardiology while patient is admitted  --Patient was evaluated by Dr. Allen from cardiology who is recommending 30-day Zio patch monitor at discharge.,  Ordered  -- Currently has patient asymptomatic, patient cannot undergo ischemic evaluation and acute intracranial bleed but will be considered in an outpatient setting.  --Shortness given outpatient cardiology follow-up.    Anxiety  Depression  -- Patient will continue to take PTA Celexa 20 mg, gabapentin 300 mg in the morning and 600 mg at bedtime.  --Continue PTA Lamictal 200 mg p.o. twice daily  -- Continue PTA Ativan 0.5 mg every 6 hours as needed    Patient was seen and examined on the day of discharge ,she is feeling well, does not have any complaints , I did review the discharge medications and instructions with the patient and plan for her to follow up with the PCP after the hospitalization .patient was in agreement , she is discharged in stable condition to her home with outpatient PT.    Consultations This Hospital Stay   PHYSICAL THERAPY ADULT IP CONSULT  CARE MANAGEMENT / SOCIAL WORK IP CONSULT  CARDIOLOGY IP CONSULT    Code Status   Full Code    Time Spent on this Encounter   I, Reva Sanchez MD, personally saw the patient today and spent greater than 30 minutes discharging this patient.     Reva Sanchez MD, [facility]  UNIT  [address] [place] [address]  Phone: [phone]  ______________________________________________________________________    Physical Exam   Vital Signs: Temp: 98.7  F (37.1  C) Temp src: Oral BP: 135/75 Pulse: 75   Resp: 18 SpO2: 97 % O2 Device: None (Room air)    Weight: 150 lbs 0 oz    Physical Exam  Vitals and nursing note reviewed.   Constitutional:       Appearance: She is well-developed.   HENT:      Head: Normocephalic and atraumatic.   Eyes:      Pupils: Pupils are equal, round, and reactive to light.   Neck:      Thyroid: No thyromegaly.   Cardiovascular:      Rate and Rhythm: Normal rate and regular rhythm.      Heart sounds: Normal heart sounds.   Pulmonary:      Effort: Pulmonary effort is normal. No respiratory distress.      Breath sounds: Normal breath sounds.   Abdominal:      General: Bowel sounds are normal. There is no distension.      Palpations: Abdomen is soft.   Musculoskeletal:         General: No tenderness. Normal range of motion.      Cervical back: Normal range of motion and neck supple.   Skin:     General: Skin is warm and dry.      Comments: Scalp trauma and lip laceration   Neurological:      Mental Status: She is alert and oriented to person, place, and time.   Psychiatric:         Behavior: Behavior normal.          Primary Care Physician   [facility]    Discharge Orders      CT Head w/o contrast*     Physical Therapy Referral      Follow-up and recommended labs and tests     Please follow up at [facility] Neurosurgery in 1 month with head CT prior.  Please call the clinic at [phone] to schedule your appointment.    Speak to Dr. Salinas about needing a follow up with Neurosurgery and a CT scan in 4 weeks at [facility] ([employer] insurance out of network with [place])     Follow-up and recommended labs and tests     Follow up with Cardiology in 4-6 weeks    An appointment is scheduled with Dr. Campo, [facility] on  October 10 at 12:45 p.m.  [address] [place] [address]  Phone:  [phone]  [place] [facility] [place] [address]     Reason for your hospital stay    You were admitted to the hospital after the mechanical fall and was found to have a small subdural and subarachnoid hemorrhage.  You have been evaluated in detail by neurosurgery and cardiology.     Activity    Your activity upon discharge: activity as tolerated and no driving for today     Follow-up and recommended labs and tests     Follow up with primary care provider, [facility], within 7 days for hospital follow- up.  The following labs/tests are recommended: BMP and CBC.  Patient has stitches will need to be removed in 7 days.  Follow-up with neurosurgery and cardiology as recommended     Discharge Instructions    Patient was admitted to the hospital after the fall and you were diagnosed with subdural and subarachnoid hemorrhage.  You were evaluated by neurosurgery, you are recommended to undergo conservative management.  You are recommended to have a follow-up CT scan of the head in 4 weeks followed by neurosurgery follow-up.  You were also evaluated by cardiology due to the concern for new left bundle branch block on your EKG.  Echocardiogram was done which showed a normal ejection fraction and no wall motion abnormalities you will be discharged with a heart monitor with cardiology follow-up in an outpatient setting.  You were also evaluated by physical therapy and currently you are recommended to be discharged with outpatient physical therapy, please avoid taking baby aspirin or any NSAIDs like ibuprofen or naproxen till you have repeat head CT in 4 weeks and you are cleared to be started back on those medications by neurosurgery.  Your stitches will be removed by your primary care physician in 7 days.     Full Code     Leadless EKG Monitor 3 to 14 Days     Diet    Follow this diet upon discharge: Orders Placed This Encounter       Regular Diet Adult         Significant Results and Procedures   Results for orders placed or performed during the hospital encounter of 09/10/23   Head CT w/o contrast     Value    Radiologist flags Acute intracranial hemorrhage (AA)    Narrative    EXAM: CT HEAD W/O CONTRAST  LOCATION: [facility]  DATE: 9/10/2023    INDICATION: fall, no blood thinners  COMPARISON: 07/30/2009  TECHNIQUE: Routine CT Head without IV contrast. Multiplanar reformats. Dose reduction techniques were used.    FINDINGS:  INTRACRANIAL CONTENTS: There is acute right parafalcine subdural hematoma measuring up to 6 mm in thickness. The subdural hematoma extends along the right aspect of the tentorium measuring up to 4 mm in thickness. There is right frontotemporal convexity   acute subdural hematoma measuring 5 mm in thickness. Trace adjacent right frontal subarachnoid hemorrhage. No significant mass effect or midline shift. No CT evidence of acute infarct. Mild presumed chronic small vessel ischemic changes. Mild to moderate   generalized volume loss. No hydrocephalus. Intracranial atherosclerosis is present.     VISUALIZED ORBITS/SINUSES/MASTOIDS: No intraorbital abnormality. No paranasal sinus mucosal disease. No middle ear or mastoid effusion.    BONES/SOFT TISSUES: There is scalp hematoma near the left parietal vertex. Negative for calvarial fracture.      Impression    IMPRESSION:  1.  Multifocal, multicompartment acute intracranial hemorrhage as described above.  2.  No significant mass effect or midline shift.  3.  Scalp hematoma near the left parietal vertex.      [Critical Result: Acute intracranial hemorrhage    Finding was identified on 9/10/2023 1:23 PM CDT.     1.  Dr. Peck was contacted by me on 9/10/2023 1:33 PM CDT and verbalized understanding of the critical result.    CT Head w/o Contrast    Narrative    EXAM: CT HEAD W/O CONTRAST  LOCATION: [facility]  DATE:  2023    INDICATION: follow up ICH  COMPARISON: CT head 2023 and prior  TECHNIQUE: Routine CT Head without IV contrast. Multiplanar reformats. Dose reduction techniques were used.    FINDINGS:  INTRACRANIAL CONTENTS:   -Unchanged acute parafalcine subdural hematoma measuring up to 4 mm in thickness.     -Unchanged right tentorial leaflet component measuring 3 mm in thickness.     -Unchanged right frontotemporal convexity component measuring 5 mm in thickness. Unchanged small volume adjacent subarachnoid hemorrhage.    -No change in size or appearance of the ventricles.    No CT evidence of acute infarct.    VISUALIZED ORBITS/SINUSES/MASTOIDS: No intraorbital abnormality. No paranasal sinus mucosal disease. No middle ear or mastoid effusion.    BONES/SOFT TISSUES: No acute abnormality.      Impression    IMPRESSION:  1.  No significant change compared to 09/10/2023.    2.  Worsening mass effect.    3.  No interval rebleeding.   Echocardiogram Complete     Value    LVEF  55-60%    Narrative    [id number]  [id number]  [id number]  [id number]^MICHELLE^SARIKA^E     [facility]  Echocardiography Laboratory  [address] [place] [address]     Name: CHARLES EVANS  MRN: [id number]  : 1945  Study Date: 2023 09:36 AM  Age: 77 yrs  Gender: Female  Patient Location: [facility]  Reason For Study: Syncope and Collapse  Ordering Physician: SARIKA MARTINEZ  Referring Physician: SARIKA MARTINEZ  Performed By: Curly Gamboa     BSA: 1.6 m2  Height: 59 in  Weight: 150 lb  HR: 72  BP: 122/80 mmHg  ______________________________________________________________________________  Procedure  Complete Portable Echo Adult. Optison (NDC #[id number]) given intravenously.  ______________________________________________________________________________  Interpretation Summary     Left ventricular systolic function is normal.  The visual ejection fraction is 55-60%.  The right ventricle is normal in  structure, function and size.  No significant valve dysfunction.  The inferior vena cava was normal in size with preserved respiratory  variability.  There is no pericardial effusion.     No prior study for comparison.  ______________________________________________________________________________  Left Ventricle  The left ventricle is normal in structure, function and size. There is normal  left ventricular wall thickness. Left ventricular systolic function is normal.  The visual ejection fraction is 55-60%. Grade I or early diastolic  dysfunction.     Right Ventricle  The right ventricle is normal in structure, function and size.     Atria  Normal left atrial size. Right atrial size is normal.     Mitral Valve  There is mild mitral annular calcification. There is trace mitral  regurgitation.     Tricuspid Valve  The tricuspid valve is normal in structure and function. Right ventricular  systolic pressure could not be approximated due to inadequate tricuspid  regurgitation.     Aortic Valve  The aortic valve is trileaflet with aortic valve sclerosis. There is mild (1+)  aortic regurgitation.     Pulmonic Valve  The pulmonic valve is normal in structure and function.     Vessels  The aortic root is normal size. Normal size ascending aorta. The inferior vena  cava was normal in size with preserved respiratory variability.     Pericardium  There is no pericardial effusion.     ______________________________________________________________________________  MMode/2D Measurements & Calculations  IVSd: 0.82 cm  LVIDd: 4.0 cm  LVIDs: 2.5 cm  LVPWd: 0.85 cm  FS: 38.4 %  LV mass(C)d: 100.8 grams  LV mass(C)dI: 61.8 grams/m2     Ao root diam: 3.1 cm  LA dimension: 3.1 cm  asc Aorta Diam: 3.3 cm  LA/Ao: 1.0  LVOT diam: 1.8 cm  LVOT area: 2.6 cm2  Ao root diam Index (cm/m2): 1.9  asc Aorta Diam Index (cm/m2): 2.0  LA Volume (BP): 29.3 ml  LA Volume Index (BP): 18.0 ml/m2  RWT: 0.42  TAPSE: 2.2 cm     Doppler Measurements &  Calculations  MV E max juancarlos: 64.7 cm/sec  MV A max juancarlos: 85.7 cm/sec  MV E/A: 0.75  MV dec time: 0.17 sec  AI P1/2t: 617.1 msec  LV V1 max PG: 3.8 mmHg  LV V1 max: 97.7 cm/sec  LV V1 VTI: 21.8 cm  SV(LVOT): 57.7 ml  SI(LVOT): 35.3 ml/m2  PA acc time: 0.15 sec  E/E' avg: 10.0  Lateral E/e': 11.0  Medial E/e': 9.0  RV S Juancarlos: 12.5 cm/sec     ______________________________________________________________________________  Report approved by: Dontae Bedolla 09/11/2023 12:25 PM             Discharge Medications   Current Discharge Medication List        START taking these medications    Details   acetaminophen (TYLENOL) 325 MG tablet Take 2 tablets (650 mg) by mouth every 6 hours as needed for mild pain or headaches    Associated Diagnoses: Subdural hemorrhage (H); Subarachnoid hemorrhage (H); Lip laceration, initial encounter           CONTINUE these medications which have NOT CHANGED    Details   citalopram (CELEXA) 20 MG tablet Take 20 mg by mouth daily      !! gabapentin (NEURONTIN) 300 MG capsule Take 300 mg by mouth every morning      !! gabapentin (NEURONTIN) 300 MG capsule Take 600 mg by mouth At Bedtime      lamoTRIgine (LAMICTAL) 200 MG tablet Take 200 mg by mouth 2 times daily      LORazepam (ATIVAN) 0.5 MG tablet Take 0.5 mg by mouth every 6 hours as needed for anxiety      simvastatin (ZOCOR) 40 MG tablet Take 40 mg by mouth At Bedtime      traZODone (DESYREL) 50 MG tablet Take 50 mg by mouth nightly as needed for sleep       !! - Potential duplicate medications found. Please discuss with provider.        Allergies   Allergies   Allergen Reactions    Bee Venom Anaphylaxis    Wasp Venom Protein Anaphylaxis

## 2023-09-11 NOTE — PLAN OF CARE
Goal Outcome Evaluation:         Pt in for SDH, ICH. A/Ox4. Right bruised eye and swollen R upper lip with stiches. Continent of bowel and bladder. VSS. Pt adequate for discharge. Discharge paperwork, medication, future appointments reviewed with patient. Pt's questions answered. Sent home with son.

## 2023-09-11 NOTE — DISCHARGE INSTRUCTIONS
Please keep the appointment with your primary care provider Dr. Kristi Salinas on Thursday September 14 at 1:30 p.m. at [facility]

## 2023-09-11 NOTE — CONSULTS
"[facility]    Cardiology Consultation     Date of Admission:  9/10/2023    Assessment & Plan   Acacia Pinedo is a 77 year old female who was admitted on 9/10/2023.    Falls, suspect mechanical  Fall with injury, lip laceration and Subdural hematoma  LBBB, new since last EKG in 2012  dyslipidemia    The patient relates a history of falls related to her \"legs giving out\" and unsteady gait. She has not had dizziness, palpitations or loss of consciousness associated with her falls.  She does have a left bundle branch block which has not been previously noted.     30 day ziopatch monitor at discharge  Echo shows normal biventricular function, no wall motion abnormality.   Ischemic evaluation acutely after subdural hematoma and in absence of other signs/symptoms of ischemia is not recommended, but could be considered on an outpatient basis.   We will arrange for the patch monitor and outpatient cardiology follow up.     Cardiology will sign off. Please call with any additional questions.      Candy Allen MD, MD    Primary Care Physician   Physician No Ref-Primary    Reason for Consult   Reason for consult: I was asked to evaluate this patient for falls, LBBB.      History of Present Illness   Acacia Pinedo is a 77 year old female who  was admitted yestereday 9/10/23 after a fall and had a subdural hematoma and lip lac. She has h/o dyslipidemia, not hypertension.  [facility] [place] [facility] [place] [facility] She does wateraerobics and does snowshoing in the winter. She has never had chest pain, dyspnea or palpitations. She has had several falls in the past few months. She states she does not lose consiousness, but feels like her legs give out and then falls. A few yrs ago she had dizziness, like a room spinning sensation which resolved and then a month ago she had an episode of lightheadedness which was not associated with a fall.    Past Medical History   Medical history  Osteopenia  Bipolar 1 " disorder  Hyperlipidemia   Arthritis  PAD  Depressive disorder        Prior to Admission Medications   Prior to Admission Medications   Prescriptions Last Dose Informant Patient Reported? Taking?   LORazepam (ATIVAN) 0.5 MG tablet  at PRN Self Yes Yes   Sig: Take 0.5 mg by mouth every 6 hours as needed for anxiety   citalopram (CELEXA) 20 MG tablet 9/10/2023 at AM Self Yes Yes   Sig: Take 20 mg by mouth daily   gabapentin (NEURONTIN) 300 MG capsule 9/10/2023 at AM Self Yes Yes   Sig: Take 300 mg by mouth every morning   gabapentin (NEURONTIN) 300 MG capsule 9/9/2023 at HS Self Yes Yes   Sig: Take 600 mg by mouth At Bedtime   lamoTRIgine (LAMICTAL) 200 MG tablet 9/10/2023 at AM Self Yes Yes   Sig: Take 200 mg by mouth 2 times daily   simvastatin (ZOCOR) 40 MG tablet 9/9/2023 at PM Self Yes Yes   Sig: Take 40 mg by mouth At Bedtime   traZODone (DESYREL) 50 MG tablet Past Month at PRN Self Yes Yes   Sig: Take 50 mg by mouth nightly as needed for sleep      Facility-Administered Medications: None     Current Facility-Administered Medications   Medication Dose Route Frequency    citalopram  20 mg Oral Daily    gabapentin  300 mg Oral QAM    gabapentin  600 mg Oral At Bedtime    lamoTRIgine  200 mg Oral BID    simvastatin  40 mg Oral At Bedtime     Current Facility-Administered Medications   Medication Last Rate     Allergies   Allergies   Allergen Reactions    Bee Venom Anaphylaxis    Wasp Venom Protein Anaphylaxis       Social History      Nonsmoker also see HPI    Family history  No history of arrhythmia or heart disease    Review of Systems   A comprehensive review of system was performed and is negative other than that noted in the HPI or here.     Physical Exam   Vital Signs with Ranges  Temp:  [97.4  F (36.3  C)-98.8  F (37.1  C)] 98.7  F (37.1  C)  Pulse:  [58-86] 75  Resp:  [18-26] 18  BP: (119-156)/(68-80) 135/75  SpO2:  [92 %-98 %] 97 %  Wt Readings from Last 4 Encounters:   09/10/23 68 kg (150 lb)     I/O last  3 completed shifts:  In: 300 [P.O.:300]  Out: 1 [Urine:1]      Vitals: /75 (BP Location: Right arm)   Pulse 75   Temp 98.7  F (37.1  C) (Oral)   Resp 18   Wt 68 kg (150 lb)   SpO2 97%     Physical Exam:   General - Alert and oriented to time place and person in no acute distress  Eyes - No scleral icterus right eye periobital ecchymosis  HEENT - Neck supple, moist mucous membranes. Upper lip lac w/suture  Cardiovascular - regular rate and rhythm no murmur. Normal/symmetric PT pulses  Extremities - There is no edema  Respiratory - clear bilat  Skin - No pallor or cyanosis. Ecchymosis right periorbital and upper lip  Gastrointestinal - Non tender and non distended without rebound or guarding  Psych - normal affect   Neurological - awake, alert, moves all ext    Recent Labs   Lab 09/10/23  1408   WBC 10.1   HGB 13.7   MCV 93      INR 0.95      POTASSIUM 4.2   CHLORIDE 100   CO2 25   BUN 11.7   CR 0.96*   GFRESTIMATED 60*   ANIONGAP 12   KENIA 9.5   *   ALBUMIN 4.3   PROTTOTAL 6.6   BILITOTAL 0.3   ALKPHOS 110*   ALT 27   AST 28     Recent Labs   Lab 09/10/23  1408   WBC 10.1   HGB 13.7   HCT 40.7   MCV 93        No results for input(s): PH, PHV, PO2, PO2V, SAT, PCO2, PCO2V, HCO3, HCO3V in the last 168 hours.  No results for input(s): NTBNPI, NTBNP in the last 168 hours.  No results for input(s): DD in the last 168 hours.  No results for input(s): SED, CRP in the last 168 hours.  Recent Labs   Lab 09/10/23  1408        No results for input(s): TSH in the last 168 hours.  No results for input(s): COLOR, APPEARANCE, URINEGLC, URINEBILI, URINEKETONE, SG, UBLD, URINEPH, PROTEIN, UROBILINOGEN, NITRITE, LEUKEST, RBCU, WBCU in the last 168 hours.    Imaging:  Recent Results (from the past 48 hour(s))   Head CT w/o contrast   Result Value    Radiologist flags Acute intracranial hemorrhage (AA)    Narrative    EXAM: CT HEAD W/O CONTRAST  LOCATION: [facility]  HOSPITAL  DATE: 9/10/2023    INDICATION: fall, no blood thinners  COMPARISON: 07/30/2009  TECHNIQUE: Routine CT Head without IV contrast. Multiplanar reformats. Dose reduction techniques were used.    FINDINGS:  INTRACRANIAL CONTENTS: There is acute right parafalcine subdural hematoma measuring up to 6 mm in thickness. The subdural hematoma extends along the right aspect of the tentorium measuring up to 4 mm in thickness. There is right frontotemporal convexity   acute subdural hematoma measuring 5 mm in thickness. Trace adjacent right frontal subarachnoid hemorrhage. No significant mass effect or midline shift. No CT evidence of acute infarct. Mild presumed chronic small vessel ischemic changes. Mild to moderate   generalized volume loss. No hydrocephalus. Intracranial atherosclerosis is present.     VISUALIZED ORBITS/SINUSES/MASTOIDS: No intraorbital abnormality. No paranasal sinus mucosal disease. No middle ear or mastoid effusion.    BONES/SOFT TISSUES: There is scalp hematoma near the left parietal vertex. Negative for calvarial fracture.      Impression    IMPRESSION:  1.  Multifocal, multicompartment acute intracranial hemorrhage as described above.  2.  No significant mass effect or midline shift.  3.  Scalp hematoma near the left parietal vertex.      [Critical Result: Acute intracranial hemorrhage    Finding was identified on 9/10/2023 1:23 PM CDT.     1.  Dr. Peck was contacted by me on 9/10/2023 1:33 PM CDT and verbalized understanding of the critical result.    CT Head w/o Contrast    Narrative    EXAM: CT HEAD W/O CONTRAST  LOCATION: [facility]  DATE: 9/11/2023    INDICATION: follow up ICH  COMPARISON: CT head 09/20/2023 and prior  TECHNIQUE: Routine CT Head without IV contrast. Multiplanar reformats. Dose reduction techniques were used.    FINDINGS:  INTRACRANIAL CONTENTS:   -Unchanged acute parafalcine subdural hematoma measuring up to 4 mm in thickness.     -Unchanged  right tentorial leaflet component measuring 3 mm in thickness.     -Unchanged right frontotemporal convexity component measuring 5 mm in thickness. Unchanged small volume adjacent subarachnoid hemorrhage.    -No change in size or appearance of the ventricles.    No CT evidence of acute infarct.    VISUALIZED ORBITS/SINUSES/MASTOIDS: No intraorbital abnormality. No paranasal sinus mucosal disease. No middle ear or mastoid effusion.    BONES/SOFT TISSUES: No acute abnormality.      Impression    IMPRESSION:  1.  No significant change compared to 09/10/2023.    2.  Worsening mass effect.    3.  No interval rebleeding.       Echo:  Echo result w/o MOPS: Interpretation Summary Left ventricular systolic function is normal.The visual ejection fraction is 55-60%.The right ventricle is normal in structure, function and size.No significant valve dysfunction.The inferior vena cava was normal in size with preserved respiratoryvariability.There is no pericardial effusion. No prior study for comparison.      Tele: sinus    EKG sinus lbbb

## 2023-09-11 NOTE — PLAN OF CARE
Pt here with SDH, ICH. A&Ox4. Neuros are intact, ex right side eye bruised and swollen R upper lip with stiches, bruised at back of head r/t fall. VSS. Tele SR. Regular diet, thin liquids. Takes pills whole. Up with A1/ uses cane at baseline. Continent of B&B. PRN Tylenol x2 for headache. Pt scoring green on the Aggression Stop Light Tool. Plan is for outpatient MRI next. Discharge pending.

## 2023-09-11 NOTE — PROGRESS NOTES
"   09/11/23 1000   Appointment Info   Signing Clinician's Name / Credentials (PT) Dolly Grullon DPT   Quick Adds   Quick Adds Vestibular Eval   Living Environment   People in Home alone   [facility]   Home Accessibility stairs within home;stairs to enter home   Number of Stairs, Main Entrance 3   Stair Railings, Main Entrance railing on left side (ascending)   Number of Stairs, Within Home, Primary greater than 10 stairs   Stair Railings, Within Home, Primary railing on left side (ascending)   Transportation Anticipated car, drives self   Living Environment Comments [facility] [place] [facility] [place] [facility]   Self-Care   Usual Activity Tolerance good   Current Activity Tolerance moderate   Regular Exercise Yes   Activity/Exercise Type swimming;walking;biking   Exercise Amount/Frequency 3-5 times/wk   Equipment Currently Used at Home none;cane, straight   Fall history within last six months yes   Number of times patient has fallen within last six months 2   Activity/Exercise/Self-Care Comment Pt typically IND with all ADL's, typically does not use an AD but has a SPC. [facility] a [facility]. Reports also snowshoeing in the winter and loves biking in the summer but has not done that recently due to decreased balance.   General Information   Onset of Illness/Injury or Date of Surgery 09/10/23   Referring Physician Reva Sanchez MD   Patient/Family Therapy Goals Statement (PT) \"To go back home\"   Pertinent History of Current Problem (include personal factors and/or comorbidities that impact the POC) Pt is a 77 year old female who presents with a history of falls over the last 6 months. She denies dizziness most of the time and feels just unsteady on her feet resulting in falling forward onto her face. Last fall was over a month ago while on a trip, she fell on her face while stepping off the bus. She also reports tripping on her steps " often at home. Today while walking up her steps she doesn't know what happened but likely tripped on the top step resulting in her falling forward. She was carrying a hotdish and the dish was found broken. She doesn't completely remember how she fell but did pull herself up and drive to her [place] where her friends found her with several facial injuries and took her to ED. Within the ED, CT imaging of the head revealed multifocal acute ICH.   Existing Precautions/Restrictions fall   Weight-Bearing Status - LUE full weight-bearing   Weight-Bearing Status - RUE full weight-bearing   Weight-Bearing Status - LLE full weight-bearing   Weight-Bearing Status - RLE full weight-bearing   Cognition   Affect/Mental Status (Cognition) WFL   Orientation Status (Cognition) oriented x 4   Follows Commands (Cognition) WFL   Pain Assessment   Patient Currently in Pain No   Integumentary/Edema   Integumentary/Edema no deficits were identifed   Posture    Posture Forward head position;Protracted shoulders   Range of Motion (ROM)   Range of Motion ROM is WFL   Strength (Manual Muscle Testing)   Strength (Manual Muscle Testing) Able to perform R SLR;Able to perform L SLR;Deficits observed during functional mobility   Strength Comments General MMT screen completed, pt demonstrates 5/5 B LE strength in hip flex, knee ext, and ankle DF   Bed Mobility   Comment, (Bed Mobility) Supine>sitting EOB completed IND   Transfers   Comment, (Transfers) Sit>stand completed SBA   Gait/Stairs (Locomotion)   Distance in Feet (Gait) 120 ft   Comment, (Gait/Stairs) Pt ambulated w/ CGA and SPC   Balance   Balance other (describe)   Balance Comments Pt demonstrated decreased dynamic balance while ambulating, stable sitting and static standing balance   Sensory Examination   Sensory Perception patient reports no sensory changes   Coordination   Coordination no deficits were identified   Muscle Tone   Muscle Tone no deficits were identified    Oculomotor Exam   Smooth Pursuit Abnormal   Smooth Pursuit Comment Pt had no reproduction of symptoms but had difficulty tracking to R and L side   Saccades Abnormal   Saccades Comments Pt had unable to jump  directly from R to L side   VOR Abnormal   VOR Comments Reproduction of dizziness noted throughout horizontal and vertical head turns   Clinical Impression   Criteria for Skilled Therapeutic Intervention Yes, treatment indicated   PT Diagnosis (PT) Impaired functional mobility   Influenced by the following impairments Impaired balance, impaired activity tolerance   Functional limitations due to impairments Impaired gait   Clinical Presentation (PT Evaluation Complexity) Stable/Uncomplicated   Clinical Presentation Rationale Clinical judgment   Clinical Decision Making (Complexity) low complexity   Planned Therapy Interventions (PT) balance training;bed mobility training;gait training;home exercise program;motor coordination training;neuromuscular re-education;patient/family education;postural re-education;ROM (range of motion);stair training;strengthening;stretching;transfer training;progressive activity/exercise;home program guidelines   Anticipated Equipment Needs at Discharge (PT) cane, straight;walker, rolling   Risk & Benefits of therapy have been explained evaluation/treatment results reviewed;care plan/treatment goals reviewed;risks/benefits reviewed;current/potential barriers reviewed;participants voiced agreement with care plan;participants included;patient   PT Total Evaluation Time   PT Eval, Low Complexity Minutes (28004) 15   Physical Therapy Goals   PT Frequency Daily   PT Predicted Duration/Target Date for Goal Attainment 09/18/23   PT Goals Bed Mobility;Transfers;Gait;Stairs   PT: Bed Mobility Independent;Supine to/from sit;Rolling;Bridging;Goal Met;Completed   PT: Transfers Independent;Sit to/from stand;Bed to/from chair;Assistive device;Goal Met;Completed   PT: Gait Modified  independent;Greater than 200 feet;Assistive device  (SPC or FWW)   PT: Stairs Modified independent;Greater than 10 stairs;Rail on left   Interventions   Interventions Quick Adds Gait Training;Therapeutic Activity   Therapeutic Activity   Therapeutic Activities: dynamic activities to improve functional performance Minutes (48359) 15   Treatment Detail/Skilled Intervention Evaluation completed and treatment indicated. Increased time needed for repeated BP measures, see VS flowsheet for details. Pt completed supine>sitting EOB IND, slight dizziness noted upon completed but resolved quickly. Sit>stand completed SBA, cues given for forward trunk lean and for foot placement when moving in to standing. [place] transfer completed w/ SBA, pericares completed IND. Post-ambulation, pt educated on using an AD for all functional mobility, pt in agreement. Pt left supine in bed with all needs in reach and bed alarm on.   Gait Training   Gait Training Minutes (08777) 15   Symptoms Noted During/After Treatment (Gait Training) dizziness   Treatment Detail/Skilled Intervention Pt ambulated without AD and close CGA, had sliht instability throughout, able to recover on own. Pt ambulated ~120 ft w/ SPC and CGA, cues given for visual scanning, step length, and pacing throguhout. Pt cued for horizontal and vertical head turns without AD, able to maintain balance but demonstrated decreased gait speed while completing. Increased dizziness while completing head turns. Stair training completed w/ CGA and unilateral railing support. Pt educated on completed stairs w/ usage of SPC for added stability and using a step to gait pattern rather than reciprocal step pattern, pt able to complete stairs without LOB and good technique with SPC or two hands on railing.   [place] Level (Gait Training) contact guard   Assistive Device (Gait Training) straight cane   PT Discharge Planning   PT Plan Formal balance assessment (RIMA Henness[place]) without AD,  progress ambulation w/ SPC, trial FWW if needed, review stair training w/ SPC or B UE on railing for additional added stability, pt reports already completng VOR exercises through [facility] [place]   PT Discharge Recommendation (DC Rec) home with outpatient physical therapy;home with assist   PT Rationale for DC Rec Pt is moving slightly below baseline mobility with decreasing balance over the past 6 months. Pt is currently completing all functional mobility w/ CGA. Pt lives alone and is IND with all transfers and has greatest difficulty on stairs. Pt is not using her AD at home at this time but has SPC at home. Anticipate with further inpatient PT in hospital that pt will be safe to return home with usage of SPC vs FWW and continuing outpatient PT and OT services at the [facility] [place]. At this time, recommend pt has assist at home for supervision on stairs due to fall risk on stairs, if unable to have assist at home, may benefit from transition to ILF without stairs.   PT Brief overview of current status IND transfers, Amb CGA w/ SPC   Total Session Time   Timed Code Treatment Minutes 30   Total Session Time (sum of timed and untimed services) 45                                                                                 [facility]      OUTPATIENT PHYSICAL THERAPY EVALUATION  PLAN OF TREATMENT FOR OUTPATIENT REHABILITATION  (COMPLETE FOR INITIAL CLAIMS ONLY)  Patient's Last Name, First Name, M.I.  YOB: 1945  Acacia Pinedo                        Provider's Name  [facility] Medical Record No.  [id number]                               Onset Date:  09/10/23   Start of Care Date:         Type:     _X_PT   ___OT   ___SLP Medical Diagnosis:                           PT Diagnosis:  Impaired functional mobility   Visits from SOC:  1    _________________________________________________________________________________  Plan of Treatment/Functional Goals    Planned Interventions: balance training, bed mobility training, gait training, home exercise program, motor coordination training, neuromuscular re-education, patient/family education, postural re-education, ROM (range of motion), stair training, strengthening, stretching, transfer training, progressive activity/exercise, home program guidelines     Goals: See Physical Therapy Goals on Care Plan in [facility] electronic health record.    Therapy Frequency: Daily  Predicted Duration of Therapy Intervention: 09/18/23  _________________________________________________________________________________    I CERTIFY THE NEED FOR THESE SERVICES FURNISHED UNDER        THIS PLAN OF TREATMENT AND WHILE UNDER MY CARE     (Physician attestation of this document indicates review and certification of the therapy plan).               ,      Referring Physician: Reva Sanchez MD            Initial Assessment        See Physical Therapy evaluation dated   in Epic electronic health record.

## 2023-09-11 NOTE — CONSULTS
Care Management Initial Consult    General Information  Assessment completed with: Patient,    Type of CM/SW Visit: Initial Assessment    Primary Care Provider verified and updated as needed: Yes   Readmission within the last 30 days: no previous admission in last 30 days      Reason for Consult: discharge planning  Advance Care Planning: Advance Care Planning Reviewed: no concerns identified          Communication Assessment  Patient's communication style: spoken language (English or Bilingual)    Hearing Difficulty or Deaf: no        Cognitive  Cognitive/Neuro/Behavioral: WDL  Level of Consciousness: alert  Arousal Level: opens eyes spontaneously  Orientation: oriented x 4  Mood/Behavior: calm, cooperative  Best Language: 0 - No aphasia  Speech: spontaneous, clear    Living Environment:   People in home: alone     Current living Arrangements: other (see comments) (town home)      Able to return to prior arrangements: yes       Family/Social Support:  Care provided by: self  Provides care for: no one  Marital Status:   Children (friend)          Description of Support System:           Current Resources:   Patient receiving home care services: No     Community Resources:    Equipment currently used at home: none, cane, straight  Supplies currently used at home:      Employment/Financial:  Employment Status: retired        Financial Concerns: No concerns identified           Does the patient's insurance plan have a 3 day qualifying hospital stay waiver?  No    Lifestyle & Psychosocial Needs:  Social Determinants of Health     Tobacco Use: Not on file   Alcohol Use: Not on file   Financial Resource Strain: Not on file   Food Insecurity: Not on file   Transportation Needs: Not on file   Physical Activity: Not on file   Stress: Not on file   Social Connections: Not on file   Intimate Partner Violence: Not on file   Depression: Not on file   Housing Stability: Not on file       Functional Status:  Prior to  admission patient needed assistance: no      Mental Health Status:          Chemical Dependency Status:                Values/Beliefs:  Spiritual, Cultural Beliefs, [employer] Practices, Values that affect care:                 Additional Information:  CM consult for discharge planning.  Met with patient.  She was admitted after a fall.  Reviewed observation status and gave her a MOON.  She lives alone in a townhouse.  She does have stairs, but has kitchen, bedroom and bathroom on one level.  She has history of falling and dizziness and has [facility] [place] [facility].  She plans to continue this after discharge.  She stated her son will be coming from [place] today.  She stated he is retired but farms.  Recommended she talk to him about staying with her for a couple of days after discharge and she stated she would do this.  She stated she also has a good friend in [place] that she could ask to check in on her.    She has an appointment to see her PCP, Dr. Kristi Salinas [facility], on Thursday 9/14 at 1:30 pm    Addendum @ 1456:  Patient will need follow up with Cardiology and Neurosurgery at [facility] (due to out of network with [facility]).  Cardiology appointment scheduled.  Patient will need to speak with her PCP to arrange follow up with Neurosurgery (put information on AVS).  Will send a hand off to PCP.  Per request, faxed H&P and Cardiology consult to [facility] (fax [phone]).    Kimberly Schumacher RN, BSN, PHN  Inpatient Care Coordination  [facility]  Phone: [phone]

## 2023-09-11 NOTE — PROGRESS NOTES
[facility]    Neurosurgery Progress Note    Date of Service (when I saw the patient): 09/11/2023     Assessment & Plan   Ms. Pinedo is a 77-year-old female who presents emergency department after a fall, where she hit the side of her head and her face.  She denies loss of consciousness headache.  Today she was seen lying in bed.  She reports a moderate headache.  No reports of dizziness, nausea/vomiting, or vision changes.  No overt weakness.  Ecchymosis to right side of face with sutured laceration of lip.  No new or worsening symptoms.    Plan:  -No plans for neurosurgical intervention at this time  -Appreciate assistance from hospitalist service regarding multiple recent falls  -Continue to hold anticoagulation and antiplatelet medication  -Follow-up in 1 month with head CT prior  -Neurosurgery will sign off, please page with questions.    I have discussed the following assessment and plan Dr. Levin who is in agreement with initial plan and will follow up with further consultation recommendations.    Sherri Leon CNP  [facility] Neurosurgery  [place] [address]    Tel [phone]  Text page via [employer] Paging/Directory    Interval History   Stable.    Physical Exam   Temp: 98.7  F (37.1  C) Temp src: Oral BP: 135/75 Pulse: 75   Resp: 18 SpO2: 97 % O2 Device: None (Room air)    Vitals:    09/10/23 1223   Weight: 150 lb (68 kg)     Vital Signs with Ranges  Temp:  [97.4  F (36.3  C)-98.8  F (37.1  C)] 98.7  F (37.1  C)  Pulse:  [58-84] 75  Resp:  [18-26] 18  BP: (119-156)/(68-80) 135/75  SpO2:  [92 %-98 %] 97 %  I/O last 3 completed shifts:  In: 300 [P.O.:300]  Out: 1 [Urine:1]     , Blood pressure 135/75, pulse 75, temperature 98.7  F (37.1  C), temperature source Oral, resp. rate 18, weight 150 lb (68 kg), SpO2 97 %.  150 lbs 0 oz    Exam     Alert and oriented no acute distress  Moving all  extremities  Finger-nose slow and accurate  Negative pronator drift  Extraocular movements intact  Pupils equal and reactive    Medications      citalopram  20 mg Oral Daily    gabapentin  300 mg Oral QAM    gabapentin  600 mg Oral At Bedtime    lamoTRIgine  200 mg Oral BID    simvastatin  40 mg Oral At Bedtime       Data     CBC RESULTS:   Recent Labs   Lab Test 09/10/23  1408   WBC 10.1   RBC 4.36   HGB 13.7   HCT 40.7   MCV 93   MCH 31.4   MCHC 33.7   RDW 12.2        Basic Metabolic Panel:  Lab Results   Component Value Date     09/10/2023     08/05/2009      Lab Results   Component Value Date    POTASSIUM 4.2 09/10/2023    POTASSIUM 4.8 08/05/2009     Lab Results   Component Value Date    CHLORIDE 100 09/10/2023    CHLORIDE 97 08/05/2009     Lab Results   Component Value Date    KENIA 9.5 09/10/2023    KENIA 9.5 08/05/2009     Lab Results   Component Value Date    CO2 25 09/10/2023    CO2 29 08/05/2009     Lab Results   Component Value Date    BUN 11.7 09/10/2023    BUN 16 08/05/2009     Lab Results   Component Value Date    CR 0.96 09/10/2023    CR 0.80 08/05/2009     Lab Results   Component Value Date     09/10/2023    GLC 92 08/05/2009     INR:  Lab Results   Component Value Date    INR 0.95 09/10/2023

## 2023-09-12 DIAGNOSIS — R42 DIZZINESS: ICD-10-CM

## 2023-09-12 DIAGNOSIS — R07.9 CHEST PAIN: Primary | ICD-10-CM

## 2023-09-12 NOTE — LETTER
Acacia Pinedo  [address] [facility] [address]               This communication is to inform you of your additional zio patch monitor that will be delivered via post office mail soon. Right now, you have a zio patch monitor placed. It only holds enough memory for 14 days. After the 14 days, mail in that specific device. Additionally, please place the heart monitor, that has yet to be delivered, on for an another 14 days.     If you have any questions or concerns, please contact the [facility] either via Style on Screen message or by calling the [facility] in [place] at [phone].      Thank you,     Arnaldo Go RN  [facility] - [place]  Direct Line [phone]  Scheduling [phone]

## 2023-09-12 NOTE — CONFIDENTIAL NOTE
I saw this pt in the hospital and wanted her to have a 30d ziopatch monitor. Apparently they only do 14 days max. So they placed a 14d monitor at discharge. Can we please place another order for Zio and have it mailed to the pt so she can put it on once the first 14 day monitor runs out?     Thanks,   Meghann Bryan PA-C on 9/12/2023 at 2:19 PM     14 day zio patch monitor ordered. Scheduling messaged.    Arnaldo Go RN

## 2023-09-12 NOTE — PLAN OF CARE
Physical Therapy Discharge Summary    Reason for therapy discharge:    Discharged to home with outpatient therapy.    Progress towards therapy goal(s). See goals on Care Plan in [facility] electronic health record for goal details.  Goals partially met.  Barriers to achieving goals:   discharge from facility.    Therapy recommendation(s):    Continued therapy is recommended.  Rationale/Recommendations:  Pt close to functional baseline and is already receiving services [facility]. Per previous therapist, pt is safe to return home with son and continue OP services. .

## 2023-09-13 ENCOUNTER — HOSPITAL ENCOUNTER (OUTPATIENT)
Dept: CARDIOLOGY | Facility: CLINIC | Age: 78
Discharge: HOME | End: 2023-09-13
Attending: INTERNAL MEDICINE
Payer: COMMERCIAL

## 2023-09-13 DIAGNOSIS — R07.9 CHEST PAIN: ICD-10-CM

## 2023-09-13 DIAGNOSIS — R42 DIZZINESS: ICD-10-CM

## 2023-10-13 DIAGNOSIS — I44.7 LEFT BUNDLE BRANCH BLOCK: Primary | ICD-10-CM

## 2023-10-13 DIAGNOSIS — R07.9 CHEST PAIN: ICD-10-CM

## 2023-10-13 NOTE — PROGRESS NOTES
Order placed for follow up per result note from Dr. Allen and message sent to scheduling.     Candy Allen MD  P [facility] Heart Team 1  Cc: Nancie Dodson PA-C  Thank you for sending the result Nancie.    Team 1,    I saw this patient in the hospital.  Outpatient cardiology follow up recommended.      Please schedule the patient for outpatient cardiology visit to follow up syncope, LBBB, consider ischemic evaluation.    Thank you,  Candy Allen MD on 10/13/2023 at 3:35 PM       Addendum - Notified by scheduling pt cannot schedule follow up at our clinic due to they have [employer] insurance, pt will need to establish with a cardiologist in their network. Attempted to call pt, phone number is disconnected and pt does not have a consent to communicate on file.

## 2024-10-23 NOTE — ED NOTES
[facility]  ED Nurse Handoff Report    ED Chief complaint: Fall, Head Injury, and Laceration      ED Diagnosis:   Final diagnoses:   Subdural hemorrhage (H)   Subarachnoid hemorrhage (H)   Lip laceration, initial encounter   Need for diphtheria-tetanus-pertussis (Tdap) vaccine   Dizziness       Code Status: hospitalist to address    Allergies:   Allergies   Allergen Reactions    Bee Venom Anaphylaxis    Wasp Venom Protein Anaphylaxis       Patient Story: patient presents to ED with reports of a fall this morning. Patient has known balance issues- uses a cane and [facility] to [facility]. Patient states that she missed a step at home and fell today striking her head. Patient does not take blood thinners. She lives independently.     Head CT today:   IMPRESSION:  1.  Multifocal, multicompartment acute intracranial hemorrhage as described above.  2.  No significant mass effect or midline shift.  3.  Scalp hematoma near the left parietal vertex.    Patient received tetanus shot in ED.  Laceration to upper right lip repaired.  Bruising noted to right side of forehead.       Focused Assessment:  see above    Treatments and/or interventions provided: see MAR, Neuro-surgical consult  Patient's response to treatments and/or interventions:     To be done/followed up on inpatient unit:  remaining inpatient orders    Does this patient have any cognitive concerns?:  NO    Activity level - Baseline/Home:  Cane  Activity Level - Current:   Stand with assist x2, Walker, and Wheelchair    Patient's Preferred language: English   Needed?: No    Isolation: None  Infection: Not Applicable  Patient tested for COVID 19 prior to admission: NO  Bariatric?: No    Vital Signs:   Vitals:    09/10/23 1223 09/10/23 1401 09/10/23 1402 09/10/23 1430   BP: (!) 152/77  (!) 156/75 (!) 148/75   Pulse: 86 84  82   Resp: 20 18 26   Temp: 98.4  F (36.9  C)      TempSrc: Temporal      SpO2: 94% 96%  97%   Weight:  Your child likely has a viral upper respiratory tract infection caused by a common cold virus. Symptoms of cough, nasal congestion, runny nose, and fussiness can last a few weeks. A cough can linger for 3-4 weeks. Below are some suggestions to help with symptoms.     - Please give albuterol every 4-6 hours as needed.     - Use saline nasal spray or drops in each nostril, then use the Nose shannen to suction. Suction the nose before eating and naps/sleeping. You can purchase the Nose shannen from pharmacies ([employer]), [employer], and [employer].     - Give Tylenol or Ibuprofen (only for kids 6 months and older) every 6 hours as needed for discomfort, fussiness, or fevers. Fevers can be present with viral illnesses. A true fever is 100.4F or higher.      - Keep your child well hydrated giving formula/breast milk, pedialyte, or diluted juice frequently. As long as your child is urinating frequently and is making tears when crying, he/she should be well hydrated. When your child feels better, their appetite will improve.     - We do not recommend giving any over the counter cough suppressant or decongestant medications under the age of 4. You can try natural cough and cold syrup like Zarbee’s or Beckie’s. These may temporarily relieve symptoms.  Do not give any honey containing products to children under 1 year old.      - Do not use Vix vapor rub under 2 years old.     - Try steam showers at night to help break up mucus. Pick the smallest bathroom in the house, run the hot water until the bathroom is full of steam, and bring your child in the bathroom to breathe the moist air. Do not expose your child directly to the hot water since burns can occur. This is also a great time to read a book before bed.     - Humidifiers can be helpful to keep the air in the bedroom moist, which will decrease nose and throat irritation.      - If your child has any difficulty breathing or appears to be in distress, go to the emergency  68 kg (150 lb)          Cardiac Rhythm:Cardiac Rhythm: Normal sinus rhythm    Was the PSS-3 completed:   Yes  What interventions are required if any?               Family Comments: friend at bedside  OBS brochure/video discussed/provided to patient/family: No              Name of person given brochure if not patient:               Relationship to patient:     For the majority of the shift this patient's behavior was .   Behavioral interventions performed were .    ED NURSE PHONE NUMBER: [phone]         room.